# Patient Record
Sex: MALE | Race: WHITE
[De-identification: names, ages, dates, MRNs, and addresses within clinical notes are randomized per-mention and may not be internally consistent; named-entity substitution may affect disease eponyms.]

---

## 2020-10-02 ENCOUNTER — HOSPITAL ENCOUNTER (OUTPATIENT)
Dept: HOSPITAL 63 - LAB | Age: 75
Discharge: HOME | End: 2020-10-02
Attending: NURSE ANESTHETIST, CERTIFIED REGISTERED
Payer: MEDICARE

## 2020-10-02 DIAGNOSIS — Z20.828: Primary | ICD-10-CM

## 2020-10-06 ENCOUNTER — HOSPITAL ENCOUNTER (OUTPATIENT)
Dept: HOSPITAL 63 - SURG | Age: 75
Discharge: HOME | End: 2020-10-06
Attending: INTERNAL MEDICINE
Payer: MEDICARE

## 2020-10-06 VITALS — DIASTOLIC BLOOD PRESSURE: 109 MMHG | SYSTOLIC BLOOD PRESSURE: 160 MMHG

## 2020-10-06 DIAGNOSIS — D12.3: ICD-10-CM

## 2020-10-06 DIAGNOSIS — I49.9: ICD-10-CM

## 2020-10-06 DIAGNOSIS — Z90.49: ICD-10-CM

## 2020-10-06 DIAGNOSIS — Z79.899: ICD-10-CM

## 2020-10-06 DIAGNOSIS — D12.4: ICD-10-CM

## 2020-10-06 DIAGNOSIS — Z90.79: ICD-10-CM

## 2020-10-06 DIAGNOSIS — Z87.891: ICD-10-CM

## 2020-10-06 DIAGNOSIS — Z86.010: ICD-10-CM

## 2020-10-06 DIAGNOSIS — K57.30: ICD-10-CM

## 2020-10-06 DIAGNOSIS — Z98.890: ICD-10-CM

## 2020-10-06 DIAGNOSIS — Z12.11: Primary | ICD-10-CM

## 2020-10-06 DIAGNOSIS — Z79.82: ICD-10-CM

## 2020-10-06 DIAGNOSIS — I10: ICD-10-CM

## 2020-10-06 PROCEDURE — 88305 TISSUE EXAM BY PATHOLOGIST: CPT

## 2020-10-06 PROCEDURE — 45380 COLONOSCOPY AND BIOPSY: CPT

## 2020-10-07 NOTE — PATHOLOGY
[facility] Accession Number: [id number]

.                                                                01

Material submitted:                                        .

PART A: colon - DESCENDING COLON POLYP. Modifiers: descending

PART B: colon - TRANSVERSE POLYP. Modifiers: transverse

.                                                                02

**********************************************************************

Diagnosis:

A.  Colon biopsy, descending colon polyp:

- Tubular adenoma.

.

B.  Colon biopsies, transverse colon polyp:

- Tubular adenoma.

.

([facility]:mml; 10/07/2020)

[facility]  10/07/2020  0955 Local

**********************************************************************

.                                                                02

Comment:

There is no high grade dysplasia or evidence of malignancy.

.

([facility]:mml; 10/07/2020)

.                                                                02

Electronically signed:                                     .

Vick Calloway MD, Pathologist

NPI- [id number]

.                                                                01

Gross description:                                         .

A.  Received in formalin labeled "Pito Luis, descending colon

polyp" is a fragment of tan-brown soft tissue measuring 0.4 x 0.3 x 0.1

cm. The specimen is submitted entirely in A1.

.

B.  Received in formalin labeled "Toby, Pito, transverse polyp" are

two tan-brown soft tissue fragments measuring in aggregate 0.5 x 0.3 x 0.1

cm. The specimen is submitted entirely in B1. ([facility] 10/6/2020)

[facility]  10/06/2020  1705 Local

.                                                                02

Pathologist provided ICD-10:

D12.4, D12.3

.                                                                02

CPT                                                        .

[id number], [id number]

Specimen Comment: A courtesy copy of this report has been sent to [phone]

Specimen Comment: [id number]

Specimen Comment: Report sent to  / DR JACINTO

***Performed at:  01

   [address], [place], [place]  [id number]

   MD Uche Kelly MD Phone:  [phone]

***Performed at:  02

   [address]  [id number]

   MD Vick Calloway MD Phone:  [phone]